# Patient Record
(demographics unavailable — no encounter records)

---

## 2024-10-31 NOTE — DATA REVIEWED
[de-identified] : PROCEDURE: MRI BRAIN WAW IC  ORDER #: [id number] CC: ; ; ; End of cc's  MR BRAIN IAC PROTOCOL WITHOUT AND WITH CONTRAST  TECHNIQUE: Multiplanar multisequence imaging of the brain with attention to the IAC was performed before and after the administration of intravenous contrast.  CONTRAST: 10 mL gadavist was administered. 0 mL was discarded.  CLINICAL INDICATION: History of acoustic neuroma. Unstable gait. COMPARISON: Head CT without contrast and CT of the cervical spine without contrast 10/19/2024. MR brain without and with contrast 4/9/2022. ____________________ FINDINGS: Some of the sequences are mildly to moderately degraded by motion artifact. Internal Auditory Canals: Enhancing 7.5 x 5.0 mm mass in the right cerebellopontine angle/right IAC, similar to 4/9/2022.  Intracranial hemorrhage: None.  Infarct/Vascular: No evidence of acute infarct. Very mild amount of T2/FLAIR hyperintense signal in the periventricular/deep white matter most consistent with chronic microvascular ischemic disease.  Intracranial Mass: No evidence of intraparenchymal mass.  CSF Spaces: The ventricles and sulci are normal for the degree of volume loss. Mild diffuse cerebral volume loss.  Calvarium: Unremarkable.  Paranasal Sinuses and Orbits: Very mild mucosal thickening of some of the ethmoid air cells and of the left sphenoid sinus. Orbits are unremarkable.  Partially visualized postsurgical changes of the upper dorsal paraspinal soft tissues. Partially visualized T1 hypointense heterogeneously T2 hyperintense peripherally enhancing 4.4 x 3.2 cm fluid collection in the midline dorsal paraspinal soft tissues/surgical bed. ____________________ IMPRESSION:  1. No acute intracranial abnormality. 2. Unchanged right vestibular schwannoma as detailed above. 3. Very mild chronic microvascular ischemic disease. 4. Mild diffuse cerebral volume loss. 5. Very mild sinus disease. 6. Sterility indeterminate partially visualized fluid collection in the dorsal paraspinal soft tissues as detailed above. Clinical correlation is advised.  --- End of Report ---   Electronically Signed: ____________________________________________ Brian RHINA Faye MD 10/19/24 1443

## 2024-10-31 NOTE — HISTORY OF PRESENT ILLNESS
[FreeTextEntry1] : KERRY DAMICO is a 52 year female with a PMH of DM, migraines, right sided vestibular schwannoma, cervical myelopathy s/p C2-4 laminectomy and fusion on 10/11/24 who presents to the office today for neurosurgical consultation due to recent hospitalization at [facility] from 10/19/24 to 10/21/24 for symptoms of new fall s/p cervical decompression and fusion.  Prior to surgery, she was experiencing symptoms of hand clumsiness, gait imbalance and multiple falls at home.  The patient underwent an MRI of the brain on 10/19/24, which was independently reviewed by me today, and demonstrates no significant change in small right vestibular schwannoma as compared to MRI 2016.  She reports *** hearing in the right ear.  Last audiogram was performed ***.   Surg Hx:  Meds:  Allergies: NKDA Soc Hx: ***smoker, ***EtOH, lives with ***, works as ***

## 2024-10-31 NOTE — ASSESSMENT
[FreeTextEntry1] : I have discussed the natural history and treatment options for vestibular schwannoma with the patient. I explained the different types of treatments: medical management, radiosurgery and surgery. I explained the indications of a combination of these treatment options. In the end, I have recommended Gamma Knife Radiosurgery to treat the ***-sided vestibular schwannoma. After considering the options, the patient is leaning towards treatment with Gamma Knife radiosurgery. I have provided a referral to Dr. Junior Bridges, my partner in Radiation Oncology at [facility] [place] to begin coordination of care and will discuss the patient's decision with my partner, Dr. Joey Garcia. The patient understands the plan of care and is in agreement. All questions answered to patient satisfaction.

## 2025-05-22 NOTE — ASSESSMENT
[FreeTextEntry1] : Ms. KERRY DAMICO is a 52 year F  a preoperative evaluation in anticipation of an elective laminectomy for the excision of an intraspinal lesion at L4-L5, scheduled for May 23, 2025 surgeon:  Sukumar Montesinos MD   A multimodal analgesic regimen would be beneficial for this patient. The following plan is recommended:  Medications:  Acetaminophen (Tylenol): 650 mg orally every 6 hours, scheduled  Gabapentin: 600 mg orally three times daily for neuropathic pain  Tizanidine: 4 mg three times daily as needed for muscle spasms  Cymbalta (Duloxetine): 60 mg orally twice daily for chronic pain and mood stabilization  -  Oxycodone 10 mg orally every 4 hours as needed for moderate pain  Oxycodone 15 mg orally every 4 hours as needed for severe pain  -  Dilaudid (Hydromorphone) 0.5 mg IV push every 3 hours as needed for moderate pain (second-line)  Dilaudid (Hydromorphone) 1 mg IV push every 3 hours as needed for severe pain (second-line)  Reserve opioids for severe pain unresponsive to first-line therapies.  Physical Therapy: Encourage patient to engage in out-of-bed mobility and physical therapy exercises as tolerated to improve function and reduce discomfort.    Reevaluation: Regularly reassess the patient's pain levels and adjust the management plan as needed to optimize comfort and recovery.  Likely will need dischare to subacute rehab due to reported history of frequent falls at home, and significant care/ monitoring, and pain management needs     Continue with ongoing medical management as directed by the primary care team.  Ensure regular communication with the healthcare team regarding any changes in pain or functional status to facilitate timely adjustments and interventions.  Call to patient's pain doc Dr Arvin Conner made / aware and in line with plan of care

## 2025-05-22 NOTE — PHYSICAL EXAM
[de-identified] : Physical Exam (Telemedicine): Gen: AAOX3, NAD HEENT: PERRLA, EOMI, NCAT, NP Pulmonary: No Signs of Respiratory Distress MSK: AROM WFL, Limitations limited ROM Neurological: Grossly neurologically intact Gait: Antalgic Derm: No Rash, (-)Lesions, (-)Erythema, (-)Cyanosis Psych: Calm, Cooperative, Conversational   Disclaimer: This is a limited examination for the purposes of conducting a telemedicine. Physical exam maneuvers were modified as necessary to allow patient to self perform. A focused physician physical examination will be performed during in person visits and should be referred to to determine need for further testing, interventions or degree of disability.

## 2025-05-22 NOTE — DATA REVIEWED
[FreeTextEntry1] : #: [id number]  Practitioner Count: 4 Pharmacy Count: 2 Current Opioid Prescriptions: 2 Current Benzodiazepine Prescriptions: 1 Current Stimulant Prescriptions: 0   Patient Demographic Information (PDI)     PDI	First Name	Last Name	Birth Date	Gender	Street Address	City	State	Zip Code A	Kerry	Damico	08/01/1972	Female	[address]	[facility]	[address] B	Kerry	Damico	08/01/1972	Female	[address]	[facility]	[address]  Prescription Information    PDI Filter:   PDI	My Rx	Current Rx	Drug Type	Rx Written	Rx Dispensed	Drug	Quantity	Days Supply	Prescriber Name	Prescriber WILBERT #	Payment Method	Dispenser A	N	Y	O	05/15/2025	05/19/2025	oxycodone-acetaminophen  mg tab	120	30	Arvin [id number]	[facility] #[id number] A	N	Y	B	05/16/2025	05/17/2025	diazepam 5 mg tablet	120	30	Martita Feliz	[id number]	Insurance	[facility] #[id number] A	N	Y	O	04/17/2025	04/26/2025	oxycodone-acetaminophen  mg tab	120	30	Arvin [id number]	[facility] #[id number] A	N	N	O	04/21/2025	04/21/2025	oxycodone hcl (ir) 5 mg tablet	24	6	Alie Parra	[id number]	[facility] #[id number] A	N	N	O	03/24/2025	03/25/2025	hydrocodone-acetaminophen  mg tablet	120	30	Arvin [id number]	[facility] #[id number] A	N	N	O	03/14/2025	03/14/2025	oxycodone hcl (ir) 10 mg tab	40	7	Sukumar Montesinos	[id number]	Insurance	[facility] #[id number] A	N	N	B	03/05/2025	03/06/2025	diazepam 5 mg tablet	120	30	Martita Feliz	[id number]	[facility] #[id number] A	N	N	O	03/02/2025	03/04/2025	oxycodone hcl (ir) 10 mg tab	40	7	Sukumar Montesinos	[id number]	[facility] #[id number] A	N	N	O	02/18/2025	02/18/2025	oxycodone hcl (ir) 10 mg tab	60	10	Sukumar Montesinos	[id number]	Insurance	[facility] #[id number] A	N	N	O	02/14/2025	02/14/2025	oxycodone-acetaminophen  mg tab	6	2	Jackson Quinton King	[id number]	Insurance	[facility] #[id number] A	N	N	O	01/23/2025	01/23/2025	hydrocodone-acetaminophen  mg tablet	90	30	Arvin Conner	[id number]	Insurance	[facility] #[id number] A	N	N	B	01/17/2025	01/18/2025	diazepam 5 mg tablet	120	30	Martita Feliz	[id number]	Insurance	[facility] #[id number] A	N	N	O	12/05/2024	12/24/2024	hydrocodone-acetaminophen  mg tablet	90	30	Arvin Conner	[id number]	Insurance	[facility] #[id number] A	N	N	B	12/16/2024	12/16/2024	diazepam 5 mg tablet	90	30	Martita Feliz	[id number]	Insurance	[facility] #[id number] A	N	N	O	11/26/2024	11/29/2024	hydrocodone-acetaminophen  mg tablet	100	17	Maria Eugenia Polanco MD	[id number]	Insurance	[facility] #[id number] A	N	N	B	11/26/2024	11/26/2024	diazepam 5 mg tablet	63	21	Maria Eugenia Polanco MD	[id number]	Insurance	[facility] #[id number] A	N	N	O	10/15/2024	10/16/2024	oxycodone hcl (ir) 10 mg tab	60	10	Sukumar Montesinos	[id number]	Insurance	[facility] #[id number] A	N	N	O	10/11/2024	10/11/2024	oxycodone hcl (ir) 5 mg tablet	30	10	Jared Ariza	[id number]	Insurance	[facility] #[id number] A	N	N	O	10/05/2024	10/05/2024	hydrocodone-acetaminophen 7.5-325 mg tablet	20	5	Mustapha Vasquez	[id number]	Insurance	[facility] #[id number] A	N	N	B	09/30/2024	10/01/2024	diazepam 5 mg tablet	120	30	Martita Feliz	[id number]	Insurance	[facility] #[id number] A	N	N	O	09/05/2024	09/07/2024	hydrocodone-acetaminophen 7.5-325 mg tablet	120	30	Arvin Conner	[id number]	Insurance	[facility] #[id number] A	N	N	B	08/09/2024	09/05/2024	diazepam 5 mg tablet	120	30	Tray, Mikejoseph	[id number]	Insurance	[facility] #[id number] A	N	N	O	08/07/2024	08/11/2024	hydrocodone-acetaminophen 7.5-325 mg tablet	120	30	Arvin Conner	[id number]	Insurance	[facility] #[id number] A	N	N	B	07/26/2024	07/26/2024	diazepam 5 mg tablet	120	30	TrayMartita palmer	[id number]	Insurance	[facility] #[id number] A	N	N	O	07/09/2024	07/13/2024	tramadol hcl 50 mg tablet	120	30	Arvin Conner	[id number]	Insurance	[facility] #[id number] A	N	N	B	06/28/2024	07/01/2024	diazepam 5 mg tablet	90	30	TrayMartita palmer	[id number]	Insurance	[facility] #[id number] A	N	N	O	06/24/2024	06/25/2024	hydrocodone-acetaminophen 7.5-325 mg tablet	12	3	Trent Padilla	[id number]	Insurance	[facility] #[id number] A	N	N	O	06/04/2024	06/15/2024	tramadol hcl 50 mg tablet	90	30	Arvin Conner	[id number]	[facility] #[id number] A	N	N	B	05/31/2024	06/01/2024	diazepam 5 mg tablet	90	30	TrayMartita palmer	[id number]	Insurance	[facility] #[id number] B	N	N	O	11/17/2024	11/18/2024	oxycodone hcl (ir) 5 mg tablet	30	7	Lulaj, Breanne	[id number]	Conner	[facility] B	N	N	O	11/02/2024	11/03/2024	oxycodone hcl (ir) 10 mg tab	60	10	Lulaj, Breanne	[id number]	Conner	[facility] B	N	N	O	10/22/2024	10/22/2024	oxycodone hcl (ir) 10 mg tab	30	5	Lulaj, Breanne	[id number]	Conner	[facility] B	N	N	B	10/22/2024	10/22/2024	diazepam 5 mg tablet	28	14	Lulaj, Breanne	[id number]	[facility]

## 2025-05-22 NOTE — HISTORY OF PRESENT ILLNESS
[FreeTextEntry1] : HPI - Ms. Kerry Damico, a 52-year-old female with a complex medical history, presents for a preoperative evaluation in anticipation of an elective laminectomy for the excision of an intraspinal lesion at L4-L5, scheduled for May 23, 2025  . Surgeon:  Sukumar Montesinos MD. She has a history of multiple spine surgeries, including cervical fusions, and suffers from chronic low back pain that has been exacerbating acutely, significantly impacting her daily activities and emotional well-being. Ms. Damico reports persistent and progressing symptoms despite six weeks of provider-directed treatment involving a stretching regimen, and a recent decadron injection during an ER visit on May 17, 2025, for back pain management. Her pain is described as severe, rated as 10/10, constantly present, and worse diurnally, particularly impacting her ability to sleep and perform routine tasks like housework. This condition is functionally disabling, requiring her to use a walker due to difficulty ambulating and a history of falls. She denies any symptoms of bowel/bladder incontinence, saddle anesthesia, or numbness.  Ms. Damico's chronic pain management includes multiple medications such as oxycodone-acetaminophen, gabapentin, lidocaine patches, and other supportive treatments to address her pain levels, which are notably severe despite this regimen. She has not engaged with pain management services prior to this evaluation and has multiple comorbidities, including asthma, obstructive sleep apnea with CPAP non-compliance, hypertension, and morbid obesity.    Recommendations focus on providing CPAP postoperatively, continuous monitoring, engaging pain management for post-surgery care, and addressing her elevated fall and thromboembolism risk due to decreased mobility and morbid obesity. DVT prophylaxis will be critical during hospitalization, with potential use of pharmacological measures and low dose ASA upon discharge. Further postoperative rehab and physical therapy are suggested due to her difficulty in ambulating and past falls. Regular assessments and adjustment of her current pain management plan are advised to better manage her chronic low back pain and enhance her recovery post-surgery.  Oxycodone-acetaminophen  mg tab Gabapentin 600  mg orally TID Tizanidine 4 mg three times daily (TID) as needed Cymbalta (Duloxetine) 60 mg twice daily (BID)  Also using Diazepam 5 m mg QID for anxiety

## 2025-05-22 NOTE — REASON FOR VISIT
[Telehealth (audio & video)] : This visit was provided via telehealth using real-time 2-way audio visual technology. [Initial Consultation] : an initial pain management consultation [Home] : at home, [unfilled] , at the time of the visit. [Medical Office: ([facility])___] : at the medical office located in  [Verbal consent obtained from patient] : the patient, [unfilled]